# Patient Record
Sex: FEMALE | Race: WHITE | Employment: FULL TIME | URBAN - METROPOLITAN AREA
[De-identification: names, ages, dates, MRNs, and addresses within clinical notes are randomized per-mention and may not be internally consistent; named-entity substitution may affect disease eponyms.]

---

## 2017-10-10 PROCEDURE — 88175 CYTOPATH C/V AUTO FLUID REDO: CPT | Performed by: OBSTETRICS & GYNECOLOGY

## 2018-02-26 PROBLEM — N80.9 ENDOMETRIOSIS: Status: ACTIVE | Noted: 2018-02-26

## 2018-05-10 ENCOUNTER — OFFICE VISIT (OUTPATIENT)
Dept: PODIATRY CLINIC | Facility: CLINIC | Age: 38
End: 2018-05-10

## 2018-05-10 DIAGNOSIS — B07.0 PLANTAR WART: Primary | ICD-10-CM

## 2018-05-10 PROCEDURE — 99212 OFFICE O/P EST SF 10 MIN: CPT | Performed by: PODIATRIST

## 2018-05-10 PROCEDURE — 99202 OFFICE O/P NEW SF 15 MIN: CPT | Performed by: PODIATRIST

## 2018-05-10 NOTE — PROGRESS NOTES
HPI:    Patient ID: Johanna King is a 41year old female. HPI  This pleasant 53-year-old female presents as a new patient to me and states that she is self-referred. I have seen her .   Her chief complaint is a painful callus on the ball of requested or ordered in this encounter    Imaging & Referrals:  None       #[id number]

## 2018-05-14 PROCEDURE — 82607 VITAMIN B-12: CPT | Performed by: FAMILY MEDICINE

## 2018-05-14 PROCEDURE — 82746 ASSAY OF FOLIC ACID SERUM: CPT | Performed by: FAMILY MEDICINE

## 2018-06-07 ENCOUNTER — OFFICE VISIT (OUTPATIENT)
Dept: PODIATRY CLINIC | Facility: CLINIC | Age: 38
End: 2018-06-07

## 2018-06-07 DIAGNOSIS — B07.0 PLANTAR WART: Primary | ICD-10-CM

## 2018-06-07 PROCEDURE — 99212 OFFICE O/P EST SF 10 MIN: CPT | Performed by: PODIATRIST

## 2018-06-07 NOTE — PROGRESS NOTES
HPI:    Patient ID: Primo Dodd is a 41year old female. HPI  61-year-old female presents for follow-up and further care in reference to the wart on the plantar aspect of the left foot. Patient has a sense that is responding to present treatment.

## 2018-07-12 ENCOUNTER — OFFICE VISIT (OUTPATIENT)
Dept: PODIATRY CLINIC | Facility: CLINIC | Age: 38
End: 2018-07-12

## 2018-07-12 DIAGNOSIS — B07.0 PLANTAR WART: Primary | ICD-10-CM

## 2018-07-12 PROCEDURE — 99212 OFFICE O/P EST SF 10 MIN: CPT | Performed by: PODIATRIST

## 2018-07-12 NOTE — PROGRESS NOTES
HPI:    Patient ID: Talha Faith is a 41year old female. HPI  This 51-year-old female presents for follow-up in reference to the wart treatment on the plantar aspect of the left foot. Patient continues to feel as though is reducing in size.   Revi

## 2018-08-16 ENCOUNTER — OFFICE VISIT (OUTPATIENT)
Dept: PODIATRY CLINIC | Facility: CLINIC | Age: 38
End: 2018-08-16
Payer: COMMERCIAL

## 2018-08-16 DIAGNOSIS — B07.0 PLANTAR WART: Primary | ICD-10-CM

## 2018-08-16 PROCEDURE — 99212 OFFICE O/P EST SF 10 MIN: CPT | Performed by: PODIATRIST

## 2018-08-16 NOTE — PROGRESS NOTES
HPI:    Patient ID: Diallo Hamilton is a 41year old female. HPI  This 62-year-old female presents for follow-up in reference to wart treatment associated with the plantar left foot.   It is apparent that she has been consistent with local care as dire

## 2018-08-30 ENCOUNTER — OFFICE VISIT (OUTPATIENT)
Dept: PODIATRY CLINIC | Facility: CLINIC | Age: 38
End: 2018-08-30
Payer: COMMERCIAL

## 2018-08-30 VITALS — SYSTOLIC BLOOD PRESSURE: 102 MMHG | RESPIRATION RATE: 16 BRPM | HEART RATE: 84 BPM | DIASTOLIC BLOOD PRESSURE: 62 MMHG

## 2018-08-30 DIAGNOSIS — B07.0 PLANTAR WART: Primary | ICD-10-CM

## 2018-08-30 PROCEDURE — 17110 DESTRUCTION B9 LES UP TO 14: CPT | Performed by: PODIATRIST

## 2018-08-30 NOTE — PROGRESS NOTES
HPI:    Patient ID: Johanna King is a 41year old female. HPI  This 53-year-old female presents for wart removal on the plantar aspect of the left foot. I reviewed the procedure. After discussion patient signed a written consent.   Review of Syste

## 2018-08-30 NOTE — PROGRESS NOTES
Per verbal order from Dr. Alan Gonsales, draw up 1.5ml of 0.5% Marcaine and 1.5ml of 2% lidocaine for injection to Left foot.   Chanell Blakely

## 2018-09-13 ENCOUNTER — OFFICE VISIT (OUTPATIENT)
Dept: PODIATRY CLINIC | Facility: CLINIC | Age: 38
End: 2018-09-13
Payer: COMMERCIAL

## 2018-09-13 DIAGNOSIS — B07.0 PLANTAR WART: Primary | ICD-10-CM

## 2018-09-13 PROCEDURE — 99212 OFFICE O/P EST SF 10 MIN: CPT | Performed by: PODIATRIST

## 2018-09-13 NOTE — PROGRESS NOTES
HPI:    Patient ID: Enma Abrams is a 41year old female. HPI  This 51-year-old female presents for follow-up in reference to wart removal of the left foot 2 weeks ago. She has no noted complaints or concerns.   Review of Systems         Current Ou

## 2019-02-14 PROCEDURE — 87209 SMEAR COMPLEX STAIN: CPT | Performed by: INTERNAL MEDICINE

## 2019-02-14 PROCEDURE — 87272 CRYPTOSPORIDIUM AG IF: CPT | Performed by: INTERNAL MEDICINE

## 2019-02-14 PROCEDURE — 87507 IADNA-DNA/RNA PROBE TQ 12-25: CPT | Performed by: INTERNAL MEDICINE

## 2019-02-14 PROCEDURE — 87329 GIARDIA AG IA: CPT | Performed by: INTERNAL MEDICINE

## 2019-02-14 PROCEDURE — 87177 OVA AND PARASITES SMEARS: CPT | Performed by: INTERNAL MEDICINE

## 2019-02-14 PROCEDURE — 89055 LEUKOCYTE ASSESSMENT FECAL: CPT | Performed by: INTERNAL MEDICINE

## 2019-03-19 PROCEDURE — 88305 TISSUE EXAM BY PATHOLOGIST: CPT | Performed by: INTERNAL MEDICINE

## 2019-06-10 PROCEDURE — 82784 ASSAY IGA/IGD/IGG/IGM EACH: CPT | Performed by: INTERNAL MEDICINE

## 2019-06-20 PROCEDURE — 86803 HEPATITIS C AB TEST: CPT | Performed by: OBSTETRICS & GYNECOLOGY

## 2019-06-20 PROCEDURE — 87389 HIV-1 AG W/HIV-1&-2 AB AG IA: CPT | Performed by: OBSTETRICS & GYNECOLOGY

## 2019-06-20 PROCEDURE — 88175 CYTOPATH C/V AUTO FLUID REDO: CPT | Performed by: OBSTETRICS & GYNECOLOGY

## 2019-06-26 ENCOUNTER — HOSPITAL (OUTPATIENT)
Dept: OTHER | Age: 39
End: 2019-06-26

## 2019-06-26 PROCEDURE — X1094 NO CHARGE VISIT: HCPCS | Performed by: SPECIALIST/TECHNOLOGIST, OTHER

## 2019-06-28 LAB — PATHOLOGIST NAME: NORMAL

## 2019-08-20 PROBLEM — R00.2 PALPITATIONS: Status: ACTIVE | Noted: 2019-08-20

## 2019-09-11 ENCOUNTER — HOSPITAL (OUTPATIENT)
Dept: OTHER | Age: 39
End: 2019-09-11
Attending: SURGERY

## 2019-12-23 PROCEDURE — 88304 TISSUE EXAM BY PATHOLOGIST: CPT | Performed by: SURGERY

## 2023-07-13 ENCOUNTER — APPOINTMENT (OUTPATIENT)
Dept: URGENT CARE | Age: 43
End: 2023-07-13